# Patient Record
Sex: MALE | Race: ASIAN | NOT HISPANIC OR LATINO | URBAN - METROPOLITAN AREA
[De-identification: names, ages, dates, MRNs, and addresses within clinical notes are randomized per-mention and may not be internally consistent; named-entity substitution may affect disease eponyms.]

---

## 2021-08-08 ENCOUNTER — EMERGENCY (EMERGENCY)
Facility: HOSPITAL | Age: 56
LOS: 1 days | Discharge: HOME | End: 2021-08-08
Attending: STUDENT IN AN ORGANIZED HEALTH CARE EDUCATION/TRAINING PROGRAM | Admitting: STUDENT IN AN ORGANIZED HEALTH CARE EDUCATION/TRAINING PROGRAM
Payer: COMMERCIAL

## 2021-08-08 VITALS
SYSTOLIC BLOOD PRESSURE: 195 MMHG | HEART RATE: 84 BPM | TEMPERATURE: 98 F | RESPIRATION RATE: 16 BRPM | DIASTOLIC BLOOD PRESSURE: 100 MMHG | OXYGEN SATURATION: 100 %

## 2021-08-08 VITALS
OXYGEN SATURATION: 99 % | HEART RATE: 68 BPM | RESPIRATION RATE: 16 BRPM | DIASTOLIC BLOOD PRESSURE: 117 MMHG | SYSTOLIC BLOOD PRESSURE: 197 MMHG

## 2021-08-08 LAB
ALBUMIN SERPL ELPH-MCNC: 4.7 G/DL — SIGNIFICANT CHANGE UP (ref 3.3–5)
ALP SERPL-CCNC: 89 U/L — SIGNIFICANT CHANGE UP (ref 40–120)
ALT FLD-CCNC: 18 U/L — SIGNIFICANT CHANGE UP (ref 4–41)
ANION GAP SERPL CALC-SCNC: 18 MMOL/L — HIGH (ref 7–14)
APTT BLD: 32.5 SEC — SIGNIFICANT CHANGE UP (ref 27–36.3)
AST SERPL-CCNC: 28 U/L — SIGNIFICANT CHANGE UP (ref 4–40)
BASOPHILS # BLD AUTO: 0.04 K/UL — SIGNIFICANT CHANGE UP (ref 0–0.2)
BASOPHILS NFR BLD AUTO: 0.5 % — SIGNIFICANT CHANGE UP (ref 0–2)
BILIRUB SERPL-MCNC: 0.7 MG/DL — SIGNIFICANT CHANGE UP (ref 0.2–1.2)
BLD GP AB SCN SERPL QL: NEGATIVE — SIGNIFICANT CHANGE UP
BUN SERPL-MCNC: 7 MG/DL — SIGNIFICANT CHANGE UP (ref 7–23)
CALCIUM SERPL-MCNC: 9.1 MG/DL — SIGNIFICANT CHANGE UP (ref 8.4–10.5)
CHLORIDE SERPL-SCNC: 101 MMOL/L — SIGNIFICANT CHANGE UP (ref 98–107)
CO2 SERPL-SCNC: 22 MMOL/L — SIGNIFICANT CHANGE UP (ref 22–31)
CREAT SERPL-MCNC: 0.71 MG/DL — SIGNIFICANT CHANGE UP (ref 0.5–1.3)
EOSINOPHIL # BLD AUTO: 0.07 K/UL — SIGNIFICANT CHANGE UP (ref 0–0.5)
EOSINOPHIL NFR BLD AUTO: 0.9 % — SIGNIFICANT CHANGE UP (ref 0–6)
GLUCOSE SERPL-MCNC: 125 MG/DL — HIGH (ref 70–99)
HCT VFR BLD CALC: 44.5 % — SIGNIFICANT CHANGE UP (ref 39–50)
HGB BLD-MCNC: 16.4 G/DL — SIGNIFICANT CHANGE UP (ref 13–17)
IANC: 6.21 K/UL — SIGNIFICANT CHANGE UP (ref 1.5–8.5)
IMM GRANULOCYTES NFR BLD AUTO: 0.3 % — SIGNIFICANT CHANGE UP (ref 0–1.5)
INR BLD: 1.01 RATIO — SIGNIFICANT CHANGE UP (ref 0.88–1.16)
LYMPHOCYTES # BLD AUTO: 0.86 K/UL — LOW (ref 1–3.3)
LYMPHOCYTES # BLD AUTO: 11.3 % — LOW (ref 13–44)
MCHC RBC-ENTMCNC: 35.7 PG — HIGH (ref 27–34)
MCHC RBC-ENTMCNC: 36.9 GM/DL — HIGH (ref 32–36)
MCV RBC AUTO: 96.7 FL — SIGNIFICANT CHANGE UP (ref 80–100)
MONOCYTES # BLD AUTO: 0.44 K/UL — SIGNIFICANT CHANGE UP (ref 0–0.9)
MONOCYTES NFR BLD AUTO: 5.8 % — SIGNIFICANT CHANGE UP (ref 2–14)
NEUTROPHILS # BLD AUTO: 6.21 K/UL — SIGNIFICANT CHANGE UP (ref 1.8–7.4)
NEUTROPHILS NFR BLD AUTO: 81.2 % — HIGH (ref 43–77)
NRBC # BLD: 0 /100 WBCS — SIGNIFICANT CHANGE UP
NRBC # FLD: 0 K/UL — SIGNIFICANT CHANGE UP
PLATELET # BLD AUTO: 217 K/UL — SIGNIFICANT CHANGE UP (ref 150–400)
POTASSIUM SERPL-MCNC: 3.2 MMOL/L — LOW (ref 3.5–5.3)
POTASSIUM SERPL-SCNC: 3.2 MMOL/L — LOW (ref 3.5–5.3)
PROT SERPL-MCNC: 7.1 G/DL — SIGNIFICANT CHANGE UP (ref 6–8.3)
PROTHROM AB SERPL-ACNC: 11.5 SEC — SIGNIFICANT CHANGE UP (ref 10.6–13.6)
RBC # BLD: 4.6 M/UL — SIGNIFICANT CHANGE UP (ref 4.2–5.8)
RBC # FLD: 11.9 % — SIGNIFICANT CHANGE UP (ref 10.3–14.5)
RH IG SCN BLD-IMP: POSITIVE — SIGNIFICANT CHANGE UP
SARS-COV-2 RNA SPEC QL NAA+PROBE: SIGNIFICANT CHANGE UP
SODIUM SERPL-SCNC: 141 MMOL/L — SIGNIFICANT CHANGE UP (ref 135–145)
WBC # BLD: 7.64 K/UL — SIGNIFICANT CHANGE UP (ref 3.8–10.5)
WBC # FLD AUTO: 7.64 K/UL — SIGNIFICANT CHANGE UP (ref 3.8–10.5)

## 2021-08-08 PROCEDURE — 99285 EMERGENCY DEPT VISIT HI MDM: CPT

## 2021-08-08 PROCEDURE — 73552 X-RAY EXAM OF FEMUR 2/>: CPT | Mod: 26,LT

## 2021-08-08 PROCEDURE — 76376 3D RENDER W/INTRP POSTPROCES: CPT | Mod: 26

## 2021-08-08 PROCEDURE — 73590 X-RAY EXAM OF LOWER LEG: CPT | Mod: 26,LT

## 2021-08-08 PROCEDURE — 73700 CT LOWER EXTREMITY W/O DYE: CPT | Mod: 26,LT

## 2021-08-08 PROCEDURE — 73562 X-RAY EXAM OF KNEE 3: CPT | Mod: 26,LT

## 2021-08-08 RX ORDER — POTASSIUM CHLORIDE 20 MEQ
40 PACKET (EA) ORAL ONCE
Refills: 0 | Status: COMPLETED | OUTPATIENT
Start: 2021-08-08 | End: 2021-08-08

## 2021-08-08 RX ORDER — MORPHINE SULFATE 50 MG/1
4 CAPSULE, EXTENDED RELEASE ORAL ONCE
Refills: 0 | Status: DISCONTINUED | OUTPATIENT
Start: 2021-08-08 | End: 2021-08-08

## 2021-08-08 RX ORDER — CEFAZOLIN SODIUM 1 G
1000 VIAL (EA) INJECTION ONCE
Refills: 0 | Status: COMPLETED | OUTPATIENT
Start: 2021-08-08 | End: 2021-08-08

## 2021-08-08 RX ORDER — TETANUS TOXOID, REDUCED DIPHTHERIA TOXOID AND ACELLULAR PERTUSSIS VACCINE, ADSORBED 5; 2.5; 8; 8; 2.5 [IU]/.5ML; [IU]/.5ML; UG/.5ML; UG/.5ML; UG/.5ML
0.5 SUSPENSION INTRAMUSCULAR ONCE
Refills: 0 | Status: COMPLETED | OUTPATIENT
Start: 2021-08-08 | End: 2021-08-08

## 2021-08-08 RX ADMIN — Medication 40 MILLIEQUIVALENT(S): at 22:05

## 2021-08-08 RX ADMIN — MORPHINE SULFATE 4 MILLIGRAM(S): 50 CAPSULE, EXTENDED RELEASE ORAL at 18:38

## 2021-08-08 RX ADMIN — Medication 100 MILLIGRAM(S): at 18:38

## 2021-08-08 RX ADMIN — TETANUS TOXOID, REDUCED DIPHTHERIA TOXOID AND ACELLULAR PERTUSSIS VACCINE, ADSORBED 0.5 MILLILITER(S): 5; 2.5; 8; 8; 2.5 SUSPENSION INTRAMUSCULAR at 18:40

## 2021-08-08 NOTE — ED ADULT NURSE NOTE - OBJECTIVE STATEMENT
pt received to intake 10a, aox4.  pt c/o left knee pain s/p falling off a 3 foot ladder onto his left knee.  + deformity noted with laceration. pt has limitied ROM, ice pack applied to area. SL placed, labs sent awaiitng xrays

## 2021-08-08 NOTE — ED PROVIDER NOTE - PATIENT PORTAL LINK FT
You can access the FollowMyHealth Patient Portal offered by [facility] by registering at the following website: [email or web address]. By joining [employer]’s FollowMyHealth portal, you will also be able to view your health information using other applications (apps) compatible with our system.

## 2021-08-08 NOTE — ED ADULT NURSE NOTE - NSIMPLEMENTINTERV_GEN_ALL_ED
Implemented All Fall Risk Interventions:  [place] to call system. Call bell, personal items and telephone within reach. Instruct patient to call for assistance. Room bathroom lighting operational. Non-slip footwear when patient is off stretcher. Physically safe environment: no spills, clutter or unnecessary equipment. Stretcher in lowest position, wheels locked, appropriate side rails in place. Provide visual cue, wrist band, yellow gown, etc. Monitor gait and stability. Monitor for mental status changes and reorient to person, place, and time. Review medications for side effects contributing to fall risk. Reinforce activity limits and safety measures with patient and family.

## 2021-08-08 NOTE — ED PROVIDER NOTE - PHYSICAL EXAMINATION
GENERAL: Awake, alert, NAD  HEENT: NC/AT, moist mucous membranes, PERRL, EOMI  LUNGS: CTAB, no wheezes or crackles   CARDIAC: RRR, no m/r/g  ABDOMEN: Soft, normal BS, non tender, non distended, no rebound, no guarding  BACK: No midline spinal tenderness  EXT:  Edema seen on left knee possible hematoma formation. Tender to palpation. Deformity visibly seen on examination. On palpation patella fracture appreciated-most likely horizontal fracture. Decreased ROM of left knee. No signs of neurovascular compromise.  2+ DP pulses bilaterally. Popliteal artery pulse 2+ on left limb.  NEURO: A&Ox3.   SKIN: Warm and dry. No rash.

## 2021-08-08 NOTE — ED PROVIDER NOTE - ATTENDING CONTRIBUTION TO CARE
I have personally seen and examined this patient.  I have fully participated in the care of this patient. I have reviewed all pertinent clinical information, including history, physical exam, plan and the Resident’s note and agree except as noted. - MD Xochitl.    57 yo M with not on AC, p/w left knee injury after fall from the ladder    ++ swollen, effusion on the left knee, no neurological/vascular compromise, palpable split patella bone, likely horizontal fx. xray, ortho consult.

## 2021-08-08 NOTE — ED PROVIDER NOTE - OBJECTIVE STATEMENT
55yo male patient s/p fall from ladder of 1 meter distance who experienced left knee trauma. Denies head trauma or LOC. Patient had some bleeding over left knee that was easily controlled. Patient refers skin changes and edema over left knee accompanied with decreased ROM of knee. Denies loss of sensation, numbness or tingling of left limb. 55yo male patient s/p fall 1 hour ago from ladder with a fall distance of 1 meter who experienced left knee trauma. Denies head trauma or LOC. Patient had some bleeding over left knee that was easily controlled. Patient refers skin changes and edema over left knee accompanied with decreased ROM of knee. Denies loss of sensation, numbness or tingling of left limb.

## 2021-08-08 NOTE — ED ADULT TRIAGE NOTE - CHIEF COMPLAINT QUOTE
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 Splinted by EMS. Denies hitting his head or LOC, denies blood thinner use. PMH: HTN.   Michaela Torres [phone]. 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 Splinted by EMS. Denies hitting his head or LOC, denies blood thinner use. PMH: HTN.   Michaela Torres [phone].

## 2021-08-08 NOTE — ED PROVIDER NOTE - CARE PLAN
Principal Discharge DX:	Type I or II open fracture of right patella, unspecified fracture morphology, initial encounter

## 2021-08-08 NOTE — ED PROVIDER NOTE - CARE PROVIDER_API CALL
Malcom Quijano)  Orthopedics  [address] [place] [address]  Phone: [phone]  Fax: [phone]  Follow Up Time:

## 2021-08-08 NOTE — ED PROVIDER NOTE - NSFOLLOWUPINSTRUCTIONS_ED_ALL_ED_FT
Please do not hesitate to come back to the Emergency Room if you knee pain becomes worse, swollen or if you start experiencing fever. Please call the Orthopedic Team at [phone] to make an appointment with Dr. Hadley Oconnor for surgery. Please tell them that you were evaluated at the Emergency Room on 08/08/2021 for left knee fracture.     Orthopedic Team address: [address] [place] [id number]    Please do not hesitate to come back to the Emergency Room if you knee pain becomes worse, swollen or if you start experiencing fever. Please call the Orthopedic Team at [phone] to make an appointment with Dr. Malcom Jewell for surgery. Please tell them that you were evaluated at the Emergency Room on 08/08/2021 for left knee fracture.     [facility] [place] at [address] [address]  [phone]    Please do not hesitate to come back to the Emergency Room if you knee pain becomes worse, swollen or if you start experiencing fever. Please call the Orthopedic Team at [phone] to make an appointment with Dr. Malcom Jewell for surgery. Please tell them that you were evaluated at the Emergency Room on 08/08/2021 for left knee fracture.     [facility] [place] at [address] [address]  [phone]    Please do not hesitate to come back to the Emergency Room if you knee pain becomes worse, swollen or if you start experiencing fever.    Please follow up with your Primary Care Physician to manage your high blood pressure. Untreated high blood pressure can cause damage to your brain (stroke), eyes, heart and kidney.

## 2021-08-08 NOTE — ED PROVIDER NOTE - CLINICAL SUMMARY MEDICAL DECISION MAKING FREE TEXT BOX
55yo male patient s/p fall from ladder. Patient had trauma to left knee with obvious patella deformity on examination. Most likely horizontal patellar fracture. will consult ortho. 55yo male patient s/p fall from ladder. Patient had trauma to left knee with obvious patella deformity on examination. Most likely horizontal patellar fracture. will consult ortho. Patient managed for pain and antibiotic prophylaxis/tetanus shot.

## 2021-08-08 NOTE — ED PROVIDER NOTE - NS ED ROS FT
CONST: no fevers, no chills  EYES: no pain, no vision changes  ENT: no sore throat, no ear pain, no change in hearing  CV: no chest pain, no leg swelling  RESP: no shortness of breath, no cough  ABD: no abdominal pain, no nausea, no vomiting, no diarrhea  : no dysuria, no flank pain, no hematuria  MSK: no back pain. Left knee pain associated to trauma.   NEURO: no headache or additional neurologic complaints  HEME: no easy bleeding  SKIN:  no rash. Lesion over left knee injury.

## 2021-08-08 NOTE — CONSULT NOTE ADULT - SUBJECTIVE AND OBJECTIVE BOX
note to follow Orthopedics Consult Note:    This is a 56y Male who presents to the ED today with c/o L knee pain after a fall from his ladder earlier today. Pt denies any other injuries. Pt denies head trauma or LOC. Pt denies any numbness, tingling or parethesias. Pt is a community ambulator without use of any assistive devices.     Past Medical and Surgical History:  No pertinent past medical history      FALL    SysAdmin_VisitLink        Allergies:  No Known Allergies      Vitals:  T(C): 36.9 (08-08-21 @ 17:34), Max: 36.9 (08-08-21 @ 17:34)  HR: 84 (08-08-21 @ 17:34) (84 - 84)  BP: 195/100 (08-08-21 @ 17:34) (195/100 - 195/100)  RR: 16 (08-08-21 @ 17:34) (16 - 16)  SpO2: 100% (08-08-21 @ 17:34) (100% - 100%)    Labs:  CBC ( 08-08-21 @ 19:34 )                   16.4  7.64 )-----------( 217                44.5      Chem ( 08-08-21 @ 19:34 )  141  |  101  |  7  ----------------------------<  125  3.2   |  22  |  0.71        PT/INR ( 08-08-21 @ 19:34 )   PT: 11.5;   INR: 1.01      PTT ( 08-08-21 @ 19:34 )  PTT:32.5          Imaging:  X-ray shows L transverse patella fx   CT scan shows L patella fx, no free air within the joint    Physical Exam:  NAD  nonlabored resp  LLE  moderate knee effusion  superficial abrasion over knee  extensor mechanism not intact  +gastroc/ta/ehl/fhl  silt s/s/sp/dp/t  +dp        Secondary Survey:  RLE and B/L UEs with no swelling, no ecchymoses, no abrasions, no lacerations or any other signs of injury with full painless baseline ROM and no bony TTP. Able to SLR. Sensation intact distally, moving all digits. Distal pulses intact.         Assessment:  56y Male with transverse L patella fx s/p fall from ladder today      Plan:  -NWB LLE in [facility]  -Pain control  -Elevation  -outpatient f/u with Dr. Oconnor this week for operative planning     Orthopedics Consult Note:    This is a 56y Male who presents to the ED today with c/o L knee pain after a fall from his ladder earlier today. Pt denies any other injuries. Pt denies head trauma or LOC. Pt denies any numbness, tingling or parethesias. Pt is a community ambulator without use of any assistive devices.     Past Medical and Surgical History:  No pertinent past medical history      FALL    SysAdmin_VisitLink        Allergies:  No Known Allergies      Vitals:  T(C): 36.9 (08-08-21 @ 17:34), Max: 36.9 (08-08-21 @ 17:34)  HR: 84 (08-08-21 @ 17:34) (84 - 84)  BP: 195/100 (08-08-21 @ 17:34) (195/100 - 195/100)  RR: 16 (08-08-21 @ 17:34) (16 - 16)  SpO2: 100% (08-08-21 @ 17:34) (100% - 100%)    Labs:  CBC ( 08-08-21 @ 19:34 )                   16.4  7.64 )-----------( 217                44.5      Chem ( 08-08-21 @ 19:34 )  141  |  101  |  7  ----------------------------<  125  3.2   |  22  |  0.71        PT/INR ( 08-08-21 @ 19:34 )   PT: 11.5;   INR: 1.01      PTT ( 08-08-21 @ 19:34 )  PTT:32.5          Imaging:  X-ray shows L transverse patella fx   CT scan shows L patella fx, no free air within the joint    Physical Exam:  NAD  nonlabored resp  LLE  moderate knee effusion  superficial abrasion over knee  extensor mechanism not intact  +gastroc/ta/ehl/fhl  silt s/s/sp/dp/t  +dp        Secondary Survey:  RLE and B/L UEs with no swelling, no ecchymoses, no abrasions, no lacerations or any other signs of injury with full painless baseline ROM and no bony TTP. Able to SLR. Sensation intact distally, moving all digits. Distal pulses intact.         Assessment:  56y Male with transverse L patella fx s/p fall from ladder today      Plan:  -NWB LLE in [facility]  -Pain control  -Elevation  -outpatient f/u with Dr. Quijano this week for operative planning

## 2021-08-08 NOTE — ED PROVIDER NOTE - PROGRESS NOTE DETAILS
Ortho was consulted Talked to ortho. CT noncontrast of L knee ordered for traumatic arthrotomy. Patient to be d/c and follow up with ortho if CT negative. CT read: Large hemarthrosis. Moderate to severe prepatellar soft tissue swelling that is hyperdense suggesting hematoma. Ortho paged. Ortho called back. Patient to follow up with Dr. Hadley Oconnor at outpatient setting for surgery. Ortho called back. To reevaluate options to see if patient can have surgery while he's still at the hospital. Nursing reports high BP 190s/110s. Patient reports hx of HTN but not on any medication. Denies headache, changes in vision. Reports pain is very well managed at this point. Patient reassessed. Able to wiggle toes and intact sensation. Patient able to ambulate with crutches. Patient lives with wife who can take care of him. Patient to follow up with ortho in outpatient setting for surgery. Patient to be discharged. Ortho called back. Patient to follow up with Dr. Malcom Jewell at outpatient setting for surgery. Jean Paul Cooney, DO: received s/o on pt. Patient reassessed, NAD, non-toxic appearing. results dw pt/family, questions answered. pt ambulating w/ crutches, neurovascularly intact. evaluated by ortho, recs appreciated. noted elevated bp, hx htn however does NOT take meds. informed to fu w/ pcp. dc w/ ortho fu.

## 2021-08-08 NOTE — ED ADULT NURSE NOTE - CHIEF COMPLAINT QUOTE
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 Splinted by EMS. Denies hitting his head or LOC, denies blood thinner use. PMH: HTN.   Michaela Torres [phone].

## 2021-08-09 PROBLEM — Z00.00 ENCOUNTER FOR PREVENTIVE HEALTH EXAMINATION: Status: ACTIVE | Noted: 2021-08-09

## 2021-08-11 ENCOUNTER — APPOINTMENT (OUTPATIENT)
Dept: ORTHOPEDIC SURGERY | Facility: CLINIC | Age: 56
End: 2021-08-11
Payer: COMMERCIAL

## 2021-08-11 VITALS — WEIGHT: 130 LBS | HEIGHT: 68 IN | BODY MASS INDEX: 19.7 KG/M2

## 2021-08-11 PROCEDURE — 99203 OFFICE O/P NEW LOW 30 MIN: CPT

## 2021-08-12 ENCOUNTER — OUTPATIENT (OUTPATIENT)
Dept: OUTPATIENT SERVICES | Facility: HOSPITAL | Age: 56
LOS: 1 days | End: 2021-08-12
Payer: COMMERCIAL

## 2021-08-12 ENCOUNTER — TRANSCRIPTION ENCOUNTER (OUTPATIENT)
Age: 56
End: 2021-08-12

## 2021-08-12 VITALS
RESPIRATION RATE: 12 BRPM | SYSTOLIC BLOOD PRESSURE: 158 MMHG | HEART RATE: 79 BPM | OXYGEN SATURATION: 99 % | TEMPERATURE: 98 F | DIASTOLIC BLOOD PRESSURE: 96 MMHG | WEIGHT: 130.07 LBS | HEIGHT: 68 IN

## 2021-08-12 DIAGNOSIS — Z01.818 ENCOUNTER FOR OTHER PREPROCEDURAL EXAMINATION: ICD-10-CM

## 2021-08-12 DIAGNOSIS — Z11.52 ENCOUNTER FOR SCREENING FOR COVID-19: ICD-10-CM

## 2021-08-12 DIAGNOSIS — S82.002A UNSPECIFIED FRACTURE OF LEFT PATELLA, INITIAL ENCOUNTER FOR CLOSED FRACTURE: ICD-10-CM

## 2021-08-12 DIAGNOSIS — S82.042A DISPLACED COMMINUTED FRACTURE OF LEFT PATELLA, INITIAL ENCOUNTER FOR CLOSED FRACTURE: ICD-10-CM

## 2021-08-12 LAB
ANION GAP SERPL CALC-SCNC: 13 MMOL/L — SIGNIFICANT CHANGE UP (ref 5–17)
BUN SERPL-MCNC: 10 MG/DL — SIGNIFICANT CHANGE UP (ref 7–23)
CALCIUM SERPL-MCNC: 9.4 MG/DL — SIGNIFICANT CHANGE UP (ref 8.4–10.5)
CHLORIDE SERPL-SCNC: 100 MMOL/L — SIGNIFICANT CHANGE UP (ref 96–108)
CO2 SERPL-SCNC: 23 MMOL/L — SIGNIFICANT CHANGE UP (ref 22–31)
CREAT SERPL-MCNC: 0.65 MG/DL — SIGNIFICANT CHANGE UP (ref 0.5–1.3)
GLUCOSE SERPL-MCNC: 126 MG/DL — HIGH (ref 70–99)
HCT VFR BLD CALC: 41.3 % — SIGNIFICANT CHANGE UP (ref 39–50)
HGB BLD-MCNC: 15.3 G/DL — SIGNIFICANT CHANGE UP (ref 13–17)
MCHC RBC-ENTMCNC: 35.9 PG — HIGH (ref 27–34)
MCHC RBC-ENTMCNC: 37 GM/DL — HIGH (ref 32–36)
MCV RBC AUTO: 96.9 FL — SIGNIFICANT CHANGE UP (ref 80–100)
NRBC # BLD: 0 /100 WBCS — SIGNIFICANT CHANGE UP (ref 0–0)
PLATELET # BLD AUTO: 263 K/UL — SIGNIFICANT CHANGE UP (ref 150–400)
POTASSIUM SERPL-MCNC: 3.5 MMOL/L — SIGNIFICANT CHANGE UP (ref 3.5–5.3)
POTASSIUM SERPL-SCNC: 3.5 MMOL/L — SIGNIFICANT CHANGE UP (ref 3.5–5.3)
RBC # BLD: 4.26 M/UL — SIGNIFICANT CHANGE UP (ref 4.2–5.8)
RBC # FLD: 11.5 % — SIGNIFICANT CHANGE UP (ref 10.3–14.5)
SARS-COV-2 RNA SPEC QL NAA+PROBE: SIGNIFICANT CHANGE UP
SODIUM SERPL-SCNC: 136 MMOL/L — SIGNIFICANT CHANGE UP (ref 135–145)
WBC # BLD: 6.39 K/UL — SIGNIFICANT CHANGE UP (ref 3.8–10.5)
WBC # FLD AUTO: 6.39 K/UL — SIGNIFICANT CHANGE UP (ref 3.8–10.5)

## 2021-08-12 PROCEDURE — C9803: CPT

## 2021-08-12 PROCEDURE — U0003: CPT

## 2021-08-12 PROCEDURE — U0005: CPT

## 2021-08-12 RX ORDER — CEFAZOLIN SODIUM 1 G
2000 VIAL (EA) INJECTION ONCE
Refills: 0 | Status: DISCONTINUED | OUTPATIENT
Start: 2021-08-13 | End: 2021-08-27

## 2021-08-12 NOTE — ASSESSMENT
[FreeTextEntry1] : 55 yo man with left patella fracture with disruption of extensor mechanism. \par -Treatment options were discussed at length with the patient including operative and nonoperative options.  The patient showed a good understanding of his injury and treatment options.  He would like to move forward with operative management of the left patella fracture\par -Knee immobilizer\par -Weightbearing as tolerated\par -We will book him for open reduction total fixation of left patella /repair of patella tendon\par -All of the patient's questions and concerns were addressed during this visit

## 2021-08-12 NOTE — H&P PST ADULT - RESPIRATORY RATE (BREATHS/MIN)
Principal Discharge DX:	 delivery with vacuum assistance, delivered, current hospitalization  Goal:	postoperative recovery  Instructions for follow-up, activity and diet:	Nothing per vagina or strenuous activity x 8 weeks / Regular diet 12

## 2021-08-12 NOTE — HISTORY OF PRESENT ILLNESS
[de-identified] : 57 yo man with left inferior pole patella fracture on 8/8/21 after falling off a ladder. He is here for follow up and to discuss treatment options.

## 2021-08-12 NOTE — H&P PST ADULT - PROBLEM SELECTOR PLAN 1
OPEN REDUCTION INTERNAL FIXATION OF LEFT PATELLA  Pre-op education provided - all questions answered   CBC & BMP sent in PST

## 2021-08-12 NOTE — H&P PST ADULT - GAIT/BALANCE
Arrived to PST in wheelchair also with crutches, pt was able to transfer from wheelchair to crutches to stand on scale

## 2021-08-12 NOTE — PHYSICAL EXAM
[de-identified] : Comfortable in exam room\par Left knee\par - Skin intact, small abrasions with ecchymosis\par - Extensor mechanism not intact\par -Tenderness to palpation over the inferior pole of the patella\par -Patient is neurovascularly intact throughout the left leg [de-identified] : X-rays and CT of left knee show a comminuted distal pole fracture of the patella with significant displacement

## 2021-08-12 NOTE — H&P PST ADULT - HISTORY OF PRESENT ILLNESS
57 YO Mandarin speaking male PMH HTN (no meds), reports     ***Covid test scheduled for 8/12/2021 at [facility]. 57 YO Mandarin speaking male PMH HTN (no meds), reports "falling from a ladder on 8/8," with left inferior pole patella fracture, presents to [facility] for OPEN REDUCTION INTERNAL FIXATION OF LEFT PATELLA 8/13/2021. Denies any palpitations, SOB, N/V, Covid symptoms (fever, chills, cough) or exposure.     ***Covid test scheduled for 8/12/2021 at [facility].

## 2021-08-12 NOTE — H&P PST ADULT - NSANTHOSAYNRD_GEN_A_CORE
No. VINOD screening performed.  STOP BANG Legend: 0-2 = LOW Risk; 3-4 = INTERMEDIATE Risk; 5-8 = HIGH Risk

## 2021-08-13 ENCOUNTER — APPOINTMENT (OUTPATIENT)
Dept: ORTHOPEDIC SURGERY | Facility: HOSPITAL | Age: 56
End: 2021-08-13

## 2021-08-13 ENCOUNTER — OUTPATIENT (OUTPATIENT)
Dept: OUTPATIENT SERVICES | Facility: HOSPITAL | Age: 56
LOS: 1 days | End: 2021-08-13
Payer: COMMERCIAL

## 2021-08-13 VITALS
RESPIRATION RATE: 18 BRPM | HEART RATE: 81 BPM | DIASTOLIC BLOOD PRESSURE: 89 MMHG | SYSTOLIC BLOOD PRESSURE: 150 MMHG | HEIGHT: 64.5 IN | WEIGHT: 125.88 LBS | TEMPERATURE: 99 F | OXYGEN SATURATION: 98 %

## 2021-08-13 VITALS
RESPIRATION RATE: 16 BRPM | SYSTOLIC BLOOD PRESSURE: 155 MMHG | DIASTOLIC BLOOD PRESSURE: 90 MMHG | HEART RATE: 83 BPM | TEMPERATURE: 99 F | OXYGEN SATURATION: 97 %

## 2021-08-13 DIAGNOSIS — S82.042A DISPLACED COMMINUTED FRACTURE OF LEFT PATELLA, INITIAL ENCOUNTER FOR CLOSED FRACTURE: ICD-10-CM

## 2021-08-13 PROCEDURE — G0463: CPT

## 2021-08-13 PROCEDURE — 80048 BASIC METABOLIC PNL TOTAL CA: CPT

## 2021-08-13 PROCEDURE — C9399: CPT

## 2021-08-13 PROCEDURE — 27524 TREAT KNEECAP FRACTURE: CPT | Mod: LT

## 2021-08-13 PROCEDURE — 76000 FLUOROSCOPY <1 HR PHYS/QHP: CPT

## 2021-08-13 PROCEDURE — 85027 COMPLETE CBC AUTOMATED: CPT

## 2021-08-13 PROCEDURE — 97161 PT EVAL LOW COMPLEX 20 MIN: CPT

## 2021-08-13 RX ORDER — HYDROMORPHONE HYDROCHLORIDE 2 MG/ML
0.5 INJECTION INTRAMUSCULAR; INTRAVENOUS; SUBCUTANEOUS
Refills: 0 | Status: DISCONTINUED | OUTPATIENT
Start: 2021-08-13 | End: 2021-08-13

## 2021-08-13 RX ORDER — HYDRALAZINE HCL 50 MG
5 TABLET ORAL ONCE
Refills: 0 | Status: COMPLETED | OUTPATIENT
Start: 2021-08-13 | End: 2021-08-13

## 2021-08-13 RX ORDER — ONDANSETRON 8 MG/1
4 TABLET, FILM COATED ORAL ONCE
Refills: 0 | Status: DISCONTINUED | OUTPATIENT
Start: 2021-08-13 | End: 2021-08-13

## 2021-08-13 RX ORDER — OXYCODONE HYDROCHLORIDE 5 MG/1
1 TABLET ORAL
Qty: 28 | Refills: 0
Start: 2021-08-13 | End: 2021-08-19

## 2021-08-13 RX ORDER — ASPIRIN/CALCIUM CARB/MAGNESIUM 324 MG
1 TABLET ORAL
Qty: 60 | Refills: 0
Start: 2021-08-13 | End: 2021-09-11

## 2021-08-13 RX ORDER — SODIUM CHLORIDE 9 MG/ML
3 INJECTION INTRAMUSCULAR; INTRAVENOUS; SUBCUTANEOUS EVERY 8 HOURS
Refills: 0 | Status: DISCONTINUED | OUTPATIENT
Start: 2021-08-13 | End: 2021-08-13

## 2021-08-13 RX ORDER — LIDOCAINE HCL 20 MG/ML
0.2 VIAL (ML) INJECTION ONCE
Refills: 0 | Status: DISCONTINUED | OUTPATIENT
Start: 2021-08-13 | End: 2021-08-13

## 2021-08-13 RX ADMIN — HYDROMORPHONE HYDROCHLORIDE 0.5 MILLIGRAM(S): 2 INJECTION INTRAMUSCULAR; INTRAVENOUS; SUBCUTANEOUS at 12:31

## 2021-08-13 RX ADMIN — HYDROMORPHONE HYDROCHLORIDE 0.5 MILLIGRAM(S): 2 INJECTION INTRAMUSCULAR; INTRAVENOUS; SUBCUTANEOUS at 12:43

## 2021-08-13 RX ADMIN — Medication 5 MILLIGRAM(S): at 11:55

## 2021-08-13 NOTE — ASU PREOP CHECKLIST - HIBICLENS SHOWER 1 DATE
[Normal] : supple, no neck mass and thyroid not enlarged [Normal Supraclavicular Lymph Nodes] : normal supraclavicular lymph nodes [Normal Neck Lymph Nodes] : normal neck lymph nodes  [Normal Axillary Lymph Nodes] : normal axillary lymph nodes [Normal] : oriented to person, place and time, with appropriate affect [de-identified] : Right chest wall incision well-healed with no evidence of local or regional recurrence. 12-Aug-2021

## 2021-08-13 NOTE — ASU DISCHARGE PLAN (ADULT/PEDIATRIC) - CARE PROVIDER_API CALL
Malcom Quijano)  Orthopedics  [address] [place] [address]  Phone: [phone]  Fax: [phone]  Follow Up Time:

## 2021-08-13 NOTE — PRE-ANESTHESIA EVALUATION ADULT - NSANTHADDINFOFT_GEN_ALL_CORE
Discussed risks and benefits of general anesthesia including n/v, sore throat, allergy and CV/pulm complications

## 2021-08-13 NOTE — CHART NOTE - NSCHARTNOTEFT_GEN_A_CORE
The long leg post operative knee brace was dropped off fo Hu today. It was given to the resident and will be applied following their surgical procedure. The brace was locked out in extension.     BHARGAVI Reynoso  [facility]  [phone]

## 2021-08-13 NOTE — PHYSICAL THERAPY INITIAL EVALUATION ADULT - ADDITIONAL COMMENTS
PTA pt was independent with functional mobility and ADLs. Pt lives in a town house with his family no steps to negotiate.

## 2021-08-25 ENCOUNTER — TRANSCRIPTION ENCOUNTER (OUTPATIENT)
Age: 56
End: 2021-08-25

## 2021-09-01 ENCOUNTER — APPOINTMENT (OUTPATIENT)
Dept: ORTHOPEDIC SURGERY | Facility: CLINIC | Age: 56
End: 2021-09-01
Payer: COMMERCIAL

## 2021-09-01 VITALS
HEART RATE: 84 BPM | WEIGHT: 130 LBS | HEIGHT: 68 IN | DIASTOLIC BLOOD PRESSURE: 84 MMHG | BODY MASS INDEX: 19.7 KG/M2 | SYSTOLIC BLOOD PRESSURE: 146 MMHG

## 2021-09-01 PROBLEM — I10 ESSENTIAL (PRIMARY) HYPERTENSION: Chronic | Status: ACTIVE | Noted: 2021-08-12

## 2021-09-01 PROBLEM — F17.200 NICOTINE DEPENDENCE, UNSPECIFIED, UNCOMPLICATED: Chronic | Status: ACTIVE | Noted: 2021-08-12

## 2021-09-01 PROCEDURE — 99024 POSTOP FOLLOW-UP VISIT: CPT

## 2021-09-01 NOTE — HISTORY OF PRESENT ILLNESS
[de-identified] : 8/13/21 ORIF left patella [de-identified] : 56-year-old gentleman with distal patella fracture which was fixed with suture anchors on 8/13/2021.  He is doing well and is in a knee immobilizer.  A Mandarin  was used for the visit [de-identified] : Patient is comfortable in exam room\par Left knee\par -Incision is clean dry and intact, staples were removed\par -Range of motion of the knee is\par -Patient is neurovascularly intact throughout the left leg [de-identified] : No x-rays were taken today in the office [de-identified] : 56-year-old man 2-1/2 weeks out from ORIF left distal patella with suture and bone tunnels doing well [de-identified] : -Weightbearing as tolerated with knee immobilizer\par -Limit flexion of the knee to 15 degrees\par -Return to the office in 3 weeks with x-rays of the knee at that time\par -We will start physical therapy when the patient returns\par -All the patient's questions and concerns were addressed in the office

## 2021-09-22 ENCOUNTER — APPOINTMENT (OUTPATIENT)
Dept: ORTHOPEDIC SURGERY | Facility: CLINIC | Age: 56
End: 2021-09-22
Payer: COMMERCIAL

## 2021-09-22 VITALS
WEIGHT: 130 LBS | HEIGHT: 68 IN | BODY MASS INDEX: 19.7 KG/M2 | SYSTOLIC BLOOD PRESSURE: 172 MMHG | DIASTOLIC BLOOD PRESSURE: 95 MMHG | HEART RATE: 72 BPM

## 2021-09-22 PROCEDURE — 73564 X-RAY EXAM KNEE 4 OR MORE: CPT | Mod: LT

## 2021-09-22 PROCEDURE — 99024 POSTOP FOLLOW-UP VISIT: CPT

## 2021-09-22 NOTE — HISTORY OF PRESENT ILLNESS
[de-identified] : 8/13/21 ORIF left patella [de-identified] : 56-year-old gentleman with distal patella fracture which was fixed with suture on 8/13/2021.  Since his last visit he is feeling better.  He has been using his hinged knee brace and tolerating it well. [de-identified] : Patient is comfortable in exam room\par Left knee\par -Incision is clean dry and intact\par -Range of motion of the knee is 0-to 30 degrees\par -Patient is neurovascularly intact throughout the left leg [de-identified] : X-rays of the left knee were taken in the office today.  They include AP oblique and lateral.  X-rays show good overall alignment of the knee with no patella eric, and no diastases of the fracture [de-identified] : 56-year-old man approximately 6 weeks out from ORIF left distal patella with suture and bone tunnels doing well [de-identified] : -Weightbearing as tolerated with hinged knee brace\par -Limit flexion of the knee to 90 degrees\par -Start physical therapy\par -All the patient's questions and concerns were addressed in the office\par -We will see the patient again in 2 months with x-rays of the knee at that time

## 2021-11-24 ENCOUNTER — APPOINTMENT (OUTPATIENT)
Dept: ORTHOPEDIC SURGERY | Facility: CLINIC | Age: 56
End: 2021-11-24

## 2021-11-24 DIAGNOSIS — S82.042A DISPLACED COMMINUTED FRACTURE OF LEFT PATELLA, INITIAL ENCOUNTER FOR CLOSED FRACTURE: ICD-10-CM

## 2022-09-22 NOTE — PHYSICAL THERAPY INITIAL EVALUATION ADULT - ACTIVE RANGE OF MOTION EXAMINATION, REHAB EVAL
bilateral upper extremity Active ROM was WFL (within functional limits)/bilateral  lower extremity Active ROM was WFL (within functional limits)
negative...

## 2023-06-15 NOTE — ASU DISCHARGE PLAN (ADULT/PEDIATRIC) - ASU DC SPECIAL INSTRUCTIONSFT
Follow up with Dr Quijano in 7-10 days. Call office for appointment. Take medications as prescribed. Please take aspirin 325 mg twice a day for 30 days for blood clot prevention. Keep dressing clean, dry, and intact. Rest, ice, and elevate affected extremity. Weighbearing as tolerated left lower extremity in [place] brace locked in extension. Opt out
